# Patient Record
(demographics unavailable — no encounter records)

---

## 2024-10-15 NOTE — CONSULT LETTER
[Dear  ___] : Dear  [unfilled], [Courtesy Letter:] : I had the pleasure of seeing your patient, [unfilled], in my office today. [Please see my note below.] : Please see my note below. [Sincerely,] : Sincerely, [FreeTextEntry2] : Dr. Roxie Gallagher [FreeTextEntry3] : Wally Montgomery MD, BLADIMIR  Otolaryngology  Sinus and Endoscopic Skull Base Surgery  Head and Neck Surgery   [address] [facility], [address] [place] [id number]  Tel: [phone]  Fax:[phone]

## 2024-10-15 NOTE — HISTORY OF PRESENT ILLNESS
[de-identified] : Update 10/15/24: f/u CRSwNP. Previously referred to Dr Mcdonald for dupixent consideration and started on saline irrigations, not currently using, pt continues to experience persistent facial pressure, headaches, PND, cough and nasal congestion. Pt was hospitalized 2 weeks ago for PNA at [facility].  61M referred by Dr. Vargas for CRSwNP. He has persistent facial pressure, headaches, PND, cough and nasal congestion x 6 months. He has trialed flonase for months without relief. Recently started on abx/MDP for acute sinusitis. PMH of CHF and CKD on HD. Multiple recent hospitalizations for CHF exacebrations.

## 2024-10-15 NOTE — CONSULT LETTER
[Dear  ___] : Dear  [unfilled], [Courtesy Letter:] : I had the pleasure of seeing your patient, [unfilled], in my office today. [Please see my note below.] : Please see my note below. [Sincerely,] : Sincerely, [FreeTextEntry2] : Dr. Roxie Gallagher [FreeTextEntry3] : Wally Montgomery MD, BLADIMIR  Otolaryngology  Sinus and Endoscopic Skull Base Surgery  Head and Neck Surgery   [address] [facility], [address] [place] [id number]  Tel: [phone]  Fax:[phone]

## 2024-10-15 NOTE — HISTORY OF PRESENT ILLNESS
[de-identified] : Update 10/15/24: f/u CRSwNP. Previously referred to Dr Mcdonald for dupixent consideration and started on saline irrigations, not currently using, pt continues to experience persistent facial pressure, headaches, PND, cough and nasal congestion. Pt was hospitalized 2 weeks ago for PNA at [facility].  61M referred by Dr. Vargas for CRSwNP. He has persistent facial pressure, headaches, PND, cough and nasal congestion x 6 months. He has trialed flonase for months without relief. Recently started on abx/MDP for acute sinusitis. PMH of CHF and CKD on HD. Multiple recent hospitalizations for CHF exacebrations.

## 2024-10-15 NOTE — PROCEDURE
[FreeTextEntry6] : Procedure: Bilateral nasal endoscopy (CPT 54809)   Indication: Anterior rhinoscopy was inadequate to evaluate pathology.  Left: Large spur to L,  MM with polyp to top of inferior turbinate.  Right:  Large spur to L, MM with polyp to top of inferior turbinate.

## 2024-10-15 NOTE — PROCEDURE
[FreeTextEntry6] : Procedure: Bilateral nasal endoscopy (CPT 31393)   Indication: Anterior rhinoscopy was inadequate to evaluate pathology.  Left: Large spur to L,  MM with polyp to top of inferior turbinate.  Right:  Large spur to L, MM with polyp to top of inferior turbinate.

## 2024-10-15 NOTE — PLAN
[TextEntry] : We again discussed treatment options for his CRS. Given his severe medical comorbidities in difficulty tolerating in office endoscopy, he is not a good candidate for either in office procedure or OR procedure. As such, I think initiating dupi would be his best option at this point. He will fu with Dr. Mcdonald.